# Patient Record
Sex: MALE
[De-identification: names, ages, dates, MRNs, and addresses within clinical notes are randomized per-mention and may not be internally consistent; named-entity substitution may affect disease eponyms.]

---

## 2022-01-06 ENCOUNTER — APPOINTMENT (OUTPATIENT)
Dept: UROLOGY | Facility: CLINIC | Age: 72
End: 2022-01-06
Payer: MEDICARE

## 2022-01-06 VITALS
SYSTOLIC BLOOD PRESSURE: 164 MMHG | HEIGHT: 65 IN | DIASTOLIC BLOOD PRESSURE: 91 MMHG | TEMPERATURE: 97.7 F | BODY MASS INDEX: 31.65 KG/M2 | HEART RATE: 58 BPM | WEIGHT: 190 LBS

## 2022-01-06 DIAGNOSIS — N48.6 INDURATION PENIS PLASTICA: ICD-10-CM

## 2022-01-06 DIAGNOSIS — N52.35 ERECTILE DYSFUNCTION FOLLOWING RADIATION THERAPY: ICD-10-CM

## 2022-01-06 PROCEDURE — 99204 OFFICE O/P NEW MOD 45 MIN: CPT

## 2022-01-06 RX ORDER — PAPAVERINE HYDROCHLORIDE 30 MG/ML
30 INJECTION, SOLUTION INTRAVENOUS
Qty: 2 | Refills: 0 | Status: ACTIVE | COMMUNITY
Start: 2022-01-06 | End: 1900-01-01

## 2022-01-06 NOTE — ASSESSMENT
[FreeTextEntry1] : Erectile Dysfunction s/p radiation therapy \par discussed other treatment options\par Tried Sildenafil 80 mg - partly effective \par reviewed correct medication use \par Reassured\par \par Peyronie's Disease\par Discussed causes and treatment options\par Pain on erection - very bothersome \par Right sided curvature \par Dorsal small palpable plaque\par penile Doppler US - for curve and plaque assessment\par \par Follow up penile ultrasound \par

## 2022-01-06 NOTE — HISTORY OF PRESENT ILLNESS
[Currently Experiencing ___] :  [unfilled] [Erectile Dysfunction] : Erectile Dysfunction [None] : None [FreeTextEntry1] : 71 year old male with hx of HTN, HLD, Prostate Cancer s/p radiation April 20, 2021. Referred by Dr Samano \par Presents today with complaints of erectile dysfunction s/p radiation six months ago\par Complaints of penile pain on  tip and left shaft when having erection \par Reports right sided penile curvature\par unable to do penetrative sex due to pain\par cannot recall any trauma or rough sexual intercourse\par On sildenafil 80 mg with 5/10 erection\par Denies dysuria, hematuria, flank pain

## 2022-01-06 NOTE — LETTER BODY
[FreeTextEntry2] : Curt Andrade MD\par [facility]\par [address]\[place] [address] [FreeTextEntry1] : I had the pleasure of seeing your patient,  MONO QUIÑONEZ, a 71 year old man, in the office in consultation today. Please see the attached note for full details.\par \par Thank you very much for allowing me to participate in the care of this patient. If you have any questions please feel free to call me at any time. \par \par \par Sincerely yours,\par \par \par \par Flakito Strickland MD, MARRY\par Director, Male Fertility and Microsurgery\par  of Urology\par [facility]\par

## 2022-01-06 NOTE — PHYSICAL EXAM
[General Appearance - Well Developed] : well developed [General Appearance - Well Nourished] : well nourished [Normal Appearance] : normal appearance [Well Groomed] : well groomed [General Appearance - In No Acute Distress] : no acute distress [Edema] : no peripheral edema [Respiration, Rhythm And Depth] : normal respiratory rhythm and effort [Exaggerated Use Of Accessory Muscles For Inspiration] : no accessory muscle use [Abdomen Soft] : soft [Abdomen Tenderness] : non-tender [Costovertebral Angle Tenderness] : no ~M costovertebral angle tenderness [Urethral Meatus] : meatus normal [Penis Abnormality] : normal circumcised penis [Urinary Bladder Findings] : the bladder was normal on palpation [Scrotum] : the scrotum was normal [Testes Mass (___cm)] : there were no testicular masses [Normal Station and Gait] : the gait and station were normal for the patient's age [] : no rash [No Focal Deficits] : no focal deficits [Oriented To Time, Place, And Person] : oriented to person, place, and time [Affect] : the affect was normal [Mood] : the mood was normal [Not Anxious] : not anxious [No Palpable Adenopathy] : no palpable adenopathy [FreeTextEntry1] : right dorsal palpable small plaque

## 2022-01-11 ENCOUNTER — APPOINTMENT (OUTPATIENT)
Dept: UROLOGY | Facility: CLINIC | Age: 72
End: 2022-01-11
Payer: MEDICARE

## 2022-01-11 VITALS — SYSTOLIC BLOOD PRESSURE: 156 MMHG | DIASTOLIC BLOOD PRESSURE: 86 MMHG | TEMPERATURE: 98 F | HEART RATE: 62 BPM

## 2022-01-11 PROCEDURE — 93980 PENILE VASCULAR STUDY: CPT

## 2022-01-11 PROCEDURE — 99214 OFFICE O/P EST MOD 30 MIN: CPT | Mod: 25

## 2022-01-11 PROCEDURE — 54235 NJX CORPORA CAVERNOSA RX AGT: CPT

## 2022-01-11 NOTE — HISTORY OF PRESENT ILLNESS
[FreeTextEntry1] : see attahced curvature assessment\par generous pannus makes curvature assessment very difficult\par it appears he has a severe ventral curvature and that his pain is due to attempting to bend the phallus\par difficult to plapate plaque due to abdominal fat\par

## 2022-01-11 NOTE — ASSESSMENT
[FreeTextEntry1] : chronic peyroneis\par not a xiaflex candidate due to ventral curvature\par recommend paul tlosss\par role intralesional verapamil reviewed\par role surgical plication discussed\par he and his wife will review\par f/u here PRN